# Patient Record
(demographics unavailable — no encounter records)

---

## 2024-10-30 NOTE — REVIEW OF SYSTEMS
[Negative] : Heme/Lymph [FreeTextEntry9] : cervical back, thoracic back, lumbar back, left knee pain
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
## 2024-10-30 NOTE — PHYSICAL EXAM
[de-identified] : Constitutional: Well-nourished, well-developed, No acute distress Respiratory:  Good respiratory effort, no SOB Psychiatric: Pleasant and normal affect, alert and oriented x3 Musculoskeletal: normal except where as noted in regional exam  Cervical Spine Exam APPEARANCE: no marked deformities or malalignment, normal curvature, good posture POSITIVE TENDERNESS: + Bilateral upper trapezius, levator scapula, rhomboid major, and rhomboid minor, + spasm noted in the same muscles. NONTENDER: no bony midline tenderness. ROM: limited in all planes, most notably in flexion and sidebending due to pain RESISTIVE TESTING: painful 4/5 resisted ext, bilateral sidebending, rotation and shoulder shrug , 5/5 flexion  SPECIAL TESTS: neg Spurling's b/l Vasc: 2+ radial pulse b/l Neuro: C5 - T1 intact to motor, DTRs 2+/4 biceps, triceps, brachioradialis Sensation: Intact to light touch throughout b/l UE  Thoracic Spine Exam:  normal curvature and normal alignment. good posture. no midline bony tenderness, + marked spasm and associated tenderness of bilateral paraspinal and periscapular muscles.  ROM mildly limited in sidebending and rotation due to noted spasm  Lumbar Spine Exam  APPEARANCE: no marked deformities or malalignment, normal curvature of the lumbosacral spine. good posture POSITIVE TENDERNESS: + Bilateral lumbar tenderness and spasm noted in erector spinae and quadratus lumborum NONTENDER: no bony midline tenderness ROM: full, although painful at end range of flexion and extension RESISTIVE TESTING: mildly painful 4/5 resisted flex/ext, sidebending b/l, and rotation SPECIAL TESTS: neg SLR b/l, neg JONATAN b/l, neg Trendelenburg b/l  PULSES: 2+ DP/PT pulses NEURO:  L1 - S2 intact to sensation and motor, DTRs 2+/4 patella and achilles
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
## 2024-11-25 NOTE — PROCEDURE
[IUD Placement] : intrauterine device (IUD) placement [Prevention of Pregnancy] : prevention of pregnancy [Risks] : risks [Benefits] : benefits [Infection] : infection [Bleeding] : bleeding [Neg Pregnancy Test] : negative pregnancy test [Tenaculum] : Tenaculum [Easy Passage] : Easy passage [Post Placement Transvag. US] : post placement transvaginal ultrasound [Mirena IUD] : Mirena IUD [Tolerated Well] : Patient tolerated the procedure well [No Complications] : No complications [None] : None [de-identified] : lidocaine gel
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
## 2024-11-29 NOTE — END OF VISIT
[Time Spent: ___ minutes] : I have spent [unfilled] minutes of time on the encounter which excludes teaching and separately reported services. Please follow with discharge instructions given, resume low fat, low cholesterol diet. Follow up with cardiologist in 1-2 weeks.
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
## 2024-11-29 NOTE — PHYSICAL EXAM
[Alert] : alert [Well Nourished] : well nourished [No Acute Distress] : no acute distress [Well Developed] : well developed [Normal Sclera/Conjunctiva] : normal sclera/conjunctiva [EOMI] : extra ocular movement intact [No Proptosis] : no proptosis [Normal Outer Ear/Nose] : the ears and nose were normal in appearance [Thyroid Not Enlarged] : the thyroid was not enlarged [No Respiratory Distress] : no respiratory distress [No Accessory Muscle Use] : no accessory muscle use [Normal Rate and Effort] : normal respiratory rate and effort [Normal Rate] : heart rate was normal [Regular Rhythm] : with a regular rhythm [No Edema] : no peripheral edema [Not Tender] : non-tender [Not Distended] : not distended [Soft] : abdomen soft [No Stigmata of Cushings Syndrome] : no stigmata of Cushings Syndrome [Normal Gait] : normal gait [Normal Strength/Tone] : muscle strength and tone were normal [No Rash] : no rash [No Motor Deficits] : the motor exam was normal [No Tremors] : no tremors [Oriented x3] : oriented to person, place, and time [Normal Affect] : the affect was normal [Normal Insight/Judgement] : insight and judgment were intact [Normal Mood] : the mood was normal [Acanthosis Nigricans] : no acanthosis nigricans [Acne] : no acne [Hirsutism] : no hirsutism
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
## 2024-12-03 NOTE — PHYSICAL EXAM
[LE] : Sensory: Intact in bilateral lower extremities [DP] : dorsalis pedis 2+ and symmetric bilaterally [PT] : posterior tibial 2+ and symmetric bilaterally [Normal] : Alert and in no acute distress [Poor Appearance] : well-appearing [Acute Distress] : not in acute distress [Obese] : not obese [de-identified] : The patient has no respiratory distress. Mood and affect are normal. The patient is alert and oriented to person, place and time. There is no pain with active or passive motion of the hips.  There is no tenderness of either hip.  Examination of the left knee demonstrates tenderness of the patella and anteromedial and anterolateral tenderness.  Quadriceps and hamstring function are intact.  There is no instability of collateral or cruciate ligaments.  Knee range of motion 0 to 115 degrees on the left, 0 to 120 degrees on the right.  Calves are soft and nontender.  The skin is intact.  There is no lymphedema. [de-identified] : AP, lateral, tunnel and sunrise x-rays of the left knee demonstrate no fracture or dislocation.  There are no bony abnormalities.

## 2024-12-12 NOTE — HISTORY OF PRESENT ILLNESS
[FreeTextEntry1] : follow up  [de-identified] : 44 yo F presenting for zepbound refill.  lost over 30 pounds since starting zepbound. no SE. feels well. has improved her eating habits.

## 2025-03-26 NOTE — PHYSICAL EXAM
[Appropriately responsive] : appropriately responsive [Alert] : alert [No Acute Distress] : no acute distress [No Murmurs] : no murmurs [Soft] : soft [Non-tender] : non-tender [Non-distended] : non-distended [No HSM] : No HSM [No Lesions] : no lesions [No Mass] : no mass [Oriented x3] : oriented x3 [Examination Of The Breasts] : a normal appearance [No Masses] : no breast masses were palpable [Labia Minora] : normal [Labia Majora] : normal [Normal] : normal [Uterine Adnexae] : normal